# Patient Record
Sex: MALE | Race: BLACK OR AFRICAN AMERICAN | NOT HISPANIC OR LATINO | URBAN - METROPOLITAN AREA
[De-identification: names, ages, dates, MRNs, and addresses within clinical notes are randomized per-mention and may not be internally consistent; named-entity substitution may affect disease eponyms.]

---

## 2021-08-02 ENCOUNTER — HISTORICAL (OUTPATIENT)
Dept: ADMINISTRATIVE | Facility: HOSPITAL | Age: 41
End: 2021-08-02

## 2021-09-15 ENCOUNTER — HISTORICAL (OUTPATIENT)
Dept: ADMINISTRATIVE | Facility: HOSPITAL | Age: 41
End: 2021-09-15

## 2021-09-17 ENCOUNTER — HISTORICAL (OUTPATIENT)
Dept: ADMINISTRATIVE | Facility: HOSPITAL | Age: 41
End: 2021-09-17

## 2022-04-07 ENCOUNTER — HISTORICAL (OUTPATIENT)
Dept: ADMINISTRATIVE | Facility: HOSPITAL | Age: 42
End: 2022-04-07

## 2022-04-23 VITALS — HEIGHT: 66 IN | WEIGHT: 154.31 LBS | BODY MASS INDEX: 24.8 KG/M2
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
## 2022-05-01 NOTE — HISTORICAL [facility] CERNER
This is a historical note converted from [employer]. Formatting and pictures may have been removed.  Please reference [employer] for original formatting and attached multimedia. Subjective  Encounter date/time: 7/2/21 13:00. 41 year old male presents from [facility] to telehealth for left hand pain and swelling following a fall. Patient reports that he was exiting the shower last night, slipped?and fell on top of his left hand. Reports constant pain since fall. Pain is a throbbing type. Currently rated 8/10. Associated symptom of swelling and numbness.  Review of Systems  GEN: (-) fever.  HEENT: (-) head injury.  MS: (+) fall, left hand pain/swelling.  NEURO: (+) numbness. (-) LOC.  Objective  Physical Exam  GEN: awake, alert.  HEENT: atraumatic, normocephalic.  RESP: normal effort. No distress. No audible wheezing or stridor.  MS: left hand appears generally swollen. Difficult to determine any deformity due to video quality. Painful ROM.  NEURO: alert, oriented. Answers questions and follows commands appropriately.  Assessment/Plan  1.?Fall from standing?W19.XXXA  Transfer to ED for further evaluation and management.  Ordered:  Office/Outpatient Visit Level 2 Established [id number] PC, Fall from standing  Left hand pain, 07/02/21 13:00:00 CDT  Report to closest ER, 07/02/21 13:00:00 CDT, Transfer to ED for further evaluation and management of left hand pain, swelling, numbness following a fall.  ?  2.?Left hand pain?M79.642  Ordered:  Office/Outpatient Visit Level 2 Established [id number] PC, Fall from standing  Left hand pain, 07/02/21 13:00:00 CDT  Report to closest ER, 07/02/21 13:00:00 CDT, Transfer to ED for further evaluation and management of left hand pain, swelling, numbness following a fall.  ?  Verbal order given for ED follow up to assess for fracture. Unable to document or enter orders at time of encounter. Patient was not registered in [employer] for a telehealth encounter. Discussed recommendation for ED  f/u with patient and nursing. Counseled that it is prudent to rule out fracture. All questions regarding plan of care answered. Patient and nursing voiced understanding of all recommendations and are agreeable with plan.
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
## 2022-07-01 ENCOUNTER — HOSPITAL ENCOUNTER (EMERGENCY)
Facility: HOSPITAL | Age: 42
Discharge: HOME | End: 2022-07-01
Attending: STUDENT IN AN ORGANIZED HEALTH CARE EDUCATION/TRAINING PROGRAM
Payer: COMMERCIAL

## 2022-07-01 VITALS
WEIGHT: 155 LBS | TEMPERATURE: 98 F | RESPIRATION RATE: 18 BRPM | SYSTOLIC BLOOD PRESSURE: 153 MMHG | HEIGHT: 65 IN | DIASTOLIC BLOOD PRESSURE: 74 MMHG | BODY MASS INDEX: 25.83 KG/M2 | HEART RATE: 87 BPM | OXYGEN SATURATION: 95 %

## 2022-07-01 DIAGNOSIS — S01.01XA LACERATION OF SCALP, INITIAL ENCOUNTER: Primary | ICD-10-CM

## 2022-07-01 PROCEDURE — 63600175 PHARM REV CODE 636 W HCPCS: Performed by: NURSE PRACTITIONER

## 2022-07-01 PROCEDURE — 12002 RPR S/N/AX/GEN/TRNK2.6-7.5CM: CPT

## 2022-07-01 PROCEDURE — 90471 IMMUNIZATION ADMIN: CPT | Performed by: NURSE PRACTITIONER

## 2022-07-01 PROCEDURE — 99284 EMERGENCY DEPT VISIT MOD MDM: CPT | Mod: 25

## 2022-07-01 PROCEDURE — 90715 TDAP VACCINE 7 YRS/> IM: CPT | Performed by: NURSE PRACTITIONER

## 2022-07-01 RX ADMIN — TETANUS TOXOID, REDUCED DIPHTHERIA TOXOID AND ACELLULAR PERTUSSIS VACCINE, ADSORBED 0.5 ML: 5; 2.5; 8; 8; 2.5 SUSPENSION INTRAMUSCULAR at 01:07
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
## 2022-07-01 NOTE — DISCHARGE INSTRUCTIONS
Wash with soap and water daily  Neosporin ointment  Return to ER in 7 days for staple removal  Head injury precautions for 24 hours  Tylenol as needed for pain

## 2022-07-01 NOTE — ED PROVIDER NOTES
Encounter Date: 7/1/2022       History     Chief Complaint   Patient presents with    Head Laceration     Pt in [place] halfway -states he stumbled backwards and hit head on bunk -laceration noted to R side of skull approx 4-5 cm in size --denies LOC     42-year-old male is brought in by police after stumbling and striking his head on the bunk.  He has 5 cm laceration to the right occipital region.  Bleeding is controlled.  He denies any loss of consciousness.  He is unsure of last tetanus.    The history is provided by the patient and the police. No  was used.     Review of patient's allergies indicates:  No Known Allergies  History reviewed. No pertinent past medical history.  History reviewed. No pertinent surgical history.  No family history on file.  Social History     Tobacco Use    Smoking status: Former Smoker    Smokeless tobacco: Never Used   Substance Use Topics    Alcohol use: Not Currently    Drug use: Not Currently     Review of Systems   Eyes: Negative for visual disturbance.   Gastrointestinal: Negative for vomiting.   Skin: Positive for wound.   Neurological: Negative for dizziness, light-headedness and headaches.       Physical Exam     Initial Vitals [07/01/22 1333]   BP Pulse Resp Temp SpO2   (!) 153/74 87 18 98 °F (36.7 °C) 95 %      MAP       --         Physical Exam    Constitutional: He appears well-developed and well-nourished.   Eyes: EOM are normal.   Neck: Neck supple.   Cardiovascular: Intact distal pulses.   Pulmonary/Chest: No respiratory distress.   Musculoskeletal:         General: Normal range of motion.      Cervical back: Neck supple.     Neurological: He is alert and oriented to person, place, and time. GCS score is 15. GCS eye subscore is 4. GCS verbal subscore is 5. GCS motor subscore is 6.   Skin: Skin is warm and dry. Laceration noted.        5 cm laceration to the right occipital scalp.  Bleeding is controlled.  He no crepitus or depressed skull fracture  noted on exam   Psychiatric: He has a normal mood and affect.         ED Course   Lac Repair    Date/Time: 7/1/2022 1:40 PM  Performed by: TANA Perez  Authorized by: TANA Perez     Consent:     Consent obtained:  Verbal    Consent given by:  Patient    Risks discussed:  Infection    Alternatives discussed:  No treatment  Universal protocol:     Procedure explained and questions answered to patient or proxy's satisfaction: yes      Immediately prior to procedure, a time out was called: yes      Patient identity confirmed:  Verbally with patient  Laceration details:     Location:  Scalp    Scalp location:  Occipital    Length (cm):  5  Pre-procedure details:     Preparation:  Patient was prepped and draped in usual sterile fashion  Exploration:     Imaging outcome: foreign body not noted      Wound exploration: wound explored through full range of motion and entire depth of wound visualized      Contaminated: no    Treatment:     Area cleansed with:  Chlorhexidine    Amount of cleaning:  Standard    Debridement:  None    Layers/structures repaired:  Deep subcutaneous  Skin repair:     Repair method:  Staples    Number of staples:  5  Approximation:     Approximation:  Close  Repair type:     Repair type:  Simple  Post-procedure details:     Dressing:  Sterile dressing    Procedure completion:  Tolerated well, no immediate complications      Labs Reviewed - No data to display       Imaging Results    None          Medications   Tdap (BOOSTRIX) vaccine injection 0.5 mL (has no administration in time range)     Medical Decision Making:   Differential Diagnosis:   Abrasion, laceration, skull fracture  ED Management:  Laceration stapled without complication.  Patient tolerated well.  Instructed on wound care when to return to ER for staple removal.  No depressed skull fracture on palpation.  Patient without dizziness, vomiting or loss of consciousness.                      Clinical Impression:    Final diagnoses:  [S01.01XA] Laceration of scalp, initial encounter (Primary)          ED Disposition Condition    Discharge Stable        ED Prescriptions     None        Follow-up Information    None          TANA Perez  07/01/22 6622